# Patient Record
Sex: FEMALE | Race: WHITE | NOT HISPANIC OR LATINO | Employment: OTHER | URBAN - NONMETROPOLITAN AREA
[De-identification: names, ages, dates, MRNs, and addresses within clinical notes are randomized per-mention and may not be internally consistent; named-entity substitution may affect disease eponyms.]

---

## 2024-01-29 PROBLEM — I10 ESSENTIAL HYPERTENSION, BENIGN: Status: ACTIVE | Noted: 2024-01-29

## 2024-01-29 PROBLEM — R94.31 ABNORMAL EKG: Status: ACTIVE | Noted: 2024-01-29

## 2024-01-29 PROBLEM — N18.30 CKD (CHRONIC KIDNEY DISEASE), STAGE III (MULTI): Status: ACTIVE | Noted: 2024-01-29

## 2024-01-29 PROBLEM — R93.1 ELEVATED CORONARY ARTERY CALCIUM SCORE: Status: ACTIVE | Noted: 2024-01-29

## 2024-01-29 PROBLEM — I48.92 ATRIAL FLUTTER (MULTI): Status: ACTIVE | Noted: 2024-01-29

## 2024-01-29 PROBLEM — G47.30 SLEEP APNEA: Status: ACTIVE | Noted: 2024-01-29

## 2024-01-29 PROBLEM — Z79.899 HIGH RISK MEDICATION USE: Status: ACTIVE | Noted: 2024-01-29

## 2024-01-29 PROBLEM — E78.5 HYPERLIPIDEMIA: Status: ACTIVE | Noted: 2024-01-29

## 2024-01-29 PROBLEM — E11.9 DIABETES MELLITUS TYPE II, NON INSULIN DEPENDENT (MULTI): Status: ACTIVE | Noted: 2024-01-29

## 2024-01-29 RX ORDER — CLONAZEPAM 1 MG/1
1 TABLET ORAL 2 TIMES DAILY
COMMUNITY

## 2024-01-29 RX ORDER — GABAPENTIN 100 MG/1
100 CAPSULE ORAL 2 TIMES DAILY
COMMUNITY

## 2024-01-29 RX ORDER — LOSARTAN POTASSIUM 50 MG/1
50 TABLET ORAL DAILY
COMMUNITY
End: 2024-04-05 | Stop reason: ALTCHOICE

## 2024-01-29 RX ORDER — IBUPROFEN 100 MG/5ML
1000 SUSPENSION, ORAL (FINAL DOSE FORM) ORAL DAILY
COMMUNITY

## 2024-01-29 RX ORDER — ROSUVASTATIN CALCIUM 20 MG/1
20 TABLET, COATED ORAL DAILY
COMMUNITY
End: 2024-02-29 | Stop reason: SDUPTHER

## 2024-01-29 RX ORDER — EPINEPHRINE 0.22MG
100 AEROSOL WITH ADAPTER (ML) INHALATION DAILY
COMMUNITY
End: 2024-04-05 | Stop reason: ALTCHOICE

## 2024-02-28 DIAGNOSIS — E78.2 MIXED HYPERLIPIDEMIA: ICD-10-CM

## 2024-02-29 RX ORDER — ROSUVASTATIN CALCIUM 20 MG/1
20 TABLET, COATED ORAL DAILY
Qty: 90 TABLET | Refills: 3 | Status: SHIPPED | OUTPATIENT
Start: 2024-02-29

## 2024-03-21 ENCOUNTER — TELEPHONE (OUTPATIENT)
Dept: CARDIOLOGY | Facility: CLINIC | Age: 69
End: 2024-03-21
Payer: MEDICARE

## 2024-03-27 ENCOUNTER — APPOINTMENT (OUTPATIENT)
Dept: CARDIOLOGY | Facility: CLINIC | Age: 69
End: 2024-03-27
Payer: MEDICARE

## 2024-03-28 ENCOUNTER — TELEPHONE (OUTPATIENT)
Dept: CARDIOLOGY | Facility: CLINIC | Age: 69
End: 2024-03-28
Payer: MEDICARE

## 2024-04-05 ENCOUNTER — OFFICE VISIT (OUTPATIENT)
Dept: CARDIOLOGY | Facility: CLINIC | Age: 69
End: 2024-04-05
Payer: MEDICARE

## 2024-04-05 VITALS
BODY MASS INDEX: 36.54 KG/M2 | WEIGHT: 214 LBS | HEART RATE: 64 BPM | SYSTOLIC BLOOD PRESSURE: 118 MMHG | DIASTOLIC BLOOD PRESSURE: 78 MMHG | HEIGHT: 64 IN

## 2024-04-05 DIAGNOSIS — E66.9 CLASS 2 OBESITY: ICD-10-CM

## 2024-04-05 DIAGNOSIS — E78.2 MIXED HYPERLIPIDEMIA: ICD-10-CM

## 2024-04-05 DIAGNOSIS — Z78.9 NEVER SMOKED TOBACCO: ICD-10-CM

## 2024-04-05 DIAGNOSIS — I10 ESSENTIAL HYPERTENSION, BENIGN: ICD-10-CM

## 2024-04-05 DIAGNOSIS — I48.92 ATRIAL FLUTTER, UNSPECIFIED TYPE (MULTI): Primary | ICD-10-CM

## 2024-04-05 DIAGNOSIS — Z79.899 HIGH RISK MEDICATION USE: ICD-10-CM

## 2024-04-05 DIAGNOSIS — E11.9 DIABETES MELLITUS TYPE II, NON INSULIN DEPENDENT (MULTI): ICD-10-CM

## 2024-04-05 PROCEDURE — 99214 OFFICE O/P EST MOD 30 MIN: CPT | Performed by: INTERNAL MEDICINE

## 2024-04-05 PROCEDURE — 4010F ACE/ARB THERAPY RXD/TAKEN: CPT | Performed by: INTERNAL MEDICINE

## 2024-04-05 PROCEDURE — 3078F DIAST BP <80 MM HG: CPT | Performed by: INTERNAL MEDICINE

## 2024-04-05 PROCEDURE — 3074F SYST BP LT 130 MM HG: CPT | Performed by: INTERNAL MEDICINE

## 2024-04-05 PROCEDURE — 3008F BODY MASS INDEX DOCD: CPT | Performed by: INTERNAL MEDICINE

## 2024-04-05 PROCEDURE — 1036F TOBACCO NON-USER: CPT | Performed by: INTERNAL MEDICINE

## 2024-04-05 PROCEDURE — 1159F MED LIST DOCD IN RCRD: CPT | Performed by: INTERNAL MEDICINE

## 2024-04-05 RX ORDER — LOSARTAN POTASSIUM 50 MG/1
25 TABLET ORAL AS NEEDED
COMMUNITY

## 2024-04-05 NOTE — PATIENT INSTRUCTIONS
Please bring all medicines, vitamins, and herbal supplements with you when you come to the office.    Prescriptions will not be filled unless you are compliant with your follow up appointments or have a follow up appointment scheduled as per instruction of your physician. Refills should be requested at the time of your visit.     BMI was above normal measurement. Current weight: 97.1 kg (214 lb)  Weight change since last visit (-) denotes wt loss -4 lbs   Weight loss needed to achieve BMI 25: 68.7 Lbs  Weight loss needed to achieve BMI 30: 39.6 Lbs  Provided instructions on dietary changes  Provided instructions on exercise.

## 2024-04-05 NOTE — PROGRESS NOTES
Subjective   Deborah Wilcox is a 68 y.o. female       Chief Complaint    Follow-up          HPI   Patient is in the office for follow-up for the problems noted below.  She has had no events since her last visit with no breakthrough atrial fibrillation.  Her weight is down 4 pounds.  A1c on recent testing was elevated 7.4, LDL cholesterol was 68 mg/dL which is excellent but triglycerides over 200 mg/dL related to uncontrolled diabetes.  She confesses not being as compliant with her sugar intake and has not been doing as much exercise.  I cover all these grounds in a detailed conversation with her today.  Her examination is only remarkable for class II obesity.    Assessment/recommendations:     1-paroxysmal atrial flutter. The patient has no previous events and has had no recurrences since her last visit. Since the load of atrial fibrillation/flutter is low we will manage with rate control and anticoagulation, in couple years if there is no indication of recurrent atrial fibrillation we can drop the Eliquis.  2-sleep apnea. Tolerating CPAP machine well and follows with the sleep lab.  She is considering the inspire device if she qualifies for it  3-diabetes managed by PCP, A1c has increased to 7.4 due to dietary indiscretion.  She is considering having another family physician in [facility] instead of [place], I suggested seeing Dr. Indu Enriquez  4-hypertension on medical therapy under control.  She utilizes losartan on as-needed basis  5-elevated coronary calcium score at 225 with high concentration in the RCA up to 70 and also 55 at the LAD with 0 left main and left circumflex score. LDL cholesterol 68 mg/dL which is excellent  6-hyperlipidemia, LDL cholesterol 68mg/dL on rosuvastatin 20 mg daily.  7-class II obesity, more exercise was recommended and we talked about joining exercise program at the hospital or not on and utilizing indoor equipment such as stationary bike.  8-high risk medication with anticoagulation  "with Eliquis. No bleeding complications.  9-suggestion of diabetic neuropathy for which I encouraged the patient to get her A1c under control and if necessary see neurology  Review of Systems   All other systems reviewed and are negative.           Vitals:    04/05/24 0938   BP: 118/78   BP Location: Left arm   Patient Position: Sitting   Pulse: 64   Weight: 97.1 kg (214 lb)   Height: 1.626 m (5' 4\")        Objective   Physical Exam  Constitutional:       Appearance: Normal appearance.   HENT:      Nose: Nose normal.   Neck:      Vascular: No carotid bruit.   Cardiovascular:      Rate and Rhythm: Normal rate.      Pulses: Normal pulses.      Heart sounds: Normal heart sounds.   Pulmonary:      Effort: Pulmonary effort is normal.   Abdominal:      General: Bowel sounds are normal.      Palpations: Abdomen is soft.   Musculoskeletal:         General: Normal range of motion.      Cervical back: Normal range of motion.      Right lower leg: No edema.      Left lower leg: No edema.   Skin:     General: Skin is warm and dry.   Neurological:      General: No focal deficit present.      Mental Status: She is alert.   Psychiatric:         Mood and Affect: Mood normal.         Behavior: Behavior normal.         Thought Content: Thought content normal.         Judgment: Judgment normal.         Allergies  Penicillins and Sulfa (sulfonamide antibiotics)     Current Medications    Current Outpatient Medications:     apixaban (Eliquis) 5 mg tablet, Take 1 tablet (5 mg) by mouth 2 times a day., Disp: , Rfl:     ascorbic acid (Vitamin C) 1,000 mg tablet, Take 1 tablet (1,000 mg) by mouth once daily., Disp: , Rfl:     bisoprolol 10 mg tablet 10 mg, hydroCHLOROthiazide 12.5 mg tablet 6.25 mg, Take 1 tablet by mouth once daily., Disp: , Rfl:     clonazePAM (KlonoPIN) 1 mg tablet, Take 1 tablet (1 mg) by mouth 2 times a day., Disp: , Rfl:     empagliflozin (Jardiance) 10 mg, Take 1 tablet (10 mg) by mouth once daily., Disp: , Rfl:     " gabapentin (Neurontin) 100 mg capsule, Take 1 capsule (100 mg) by mouth 2 times a day., Disp: , Rfl:     rosuvastatin (Crestor) 20 mg tablet, take 1 tablet by mouth once daily, Disp: 90 tablet, Rfl: 3    losartan (Cozaar) 50 mg tablet, Take 0.5 tablets (25 mg) by mouth if needed (blood pressure above 140)., Disp: , Rfl:                      Assessment/Plan   1. Atrial flutter, unspecified type (CMS/HCC)  Follow Up In Cardiology      2. Essential hypertension, benign  Follow Up In Cardiology      3. Mixed hyperlipidemia        4. Diabetes mellitus type II, non insulin dependent (CMS/HCC)        5. High risk medication use        6. BMI 36.0-36.9,adult        7. Never smoked tobacco        8. Class 2 obesity                 Scribe Attestation  By signing my name below, Flores BLACKMON LPN, Scribe   attest that this documentation has been prepared under the direction and in the presence of Po Haq MD.     Provider Attestation - Scribe documentation    All medical record entries made by the Scribe were at my direction and personally dictated by me. I have reviewed the chart and agree that the record accurately reflects my personal performance of the history, physical exam, discussion and plan.

## 2024-10-23 ENCOUNTER — APPOINTMENT (OUTPATIENT)
Dept: CARDIOLOGY | Facility: CLINIC | Age: 69
End: 2024-10-23
Payer: MEDICARE